# Patient Record
Sex: FEMALE | Race: WHITE | Employment: OTHER | URBAN - METROPOLITAN AREA
[De-identification: names, ages, dates, MRNs, and addresses within clinical notes are randomized per-mention and may not be internally consistent; named-entity substitution may affect disease eponyms.]

---

## 2018-10-29 PROCEDURE — 83883 ASSAY NEPHELOMETRY NOT SPEC: CPT | Performed by: INTERNAL MEDICINE

## 2018-11-19 PROBLEM — G56.03 BILATERAL CARPAL TUNNEL SYNDROME: Status: ACTIVE | Noted: 2018-11-19

## 2018-12-31 PROBLEM — M18.11 PRIMARY OSTEOARTHRITIS OF FIRST CARPOMETACARPAL JOINT OF RIGHT HAND: Status: ACTIVE | Noted: 2018-12-31

## 2018-12-31 PROBLEM — G56.01 RIGHT CARPAL TUNNEL SYNDROME: Status: ACTIVE | Noted: 2018-12-31

## 2018-12-31 PROBLEM — Z98.890 S/P CARPAL TUNNEL RELEASE: Status: ACTIVE | Noted: 2018-12-31

## 2019-04-08 PROBLEM — I27.20 PULMONARY HYPERTENSION (HCC): Status: ACTIVE | Noted: 2019-04-08

## 2019-04-16 PROBLEM — R00.2 PALPITATIONS: Status: ACTIVE | Noted: 2019-04-16

## 2019-04-16 PROBLEM — I27.20 PULMONARY HYPERTENSION (HCC): Status: RESOLVED | Noted: 2019-04-08 | Resolved: 2019-04-16

## 2019-05-07 PROBLEM — M81.8 OTHER OSTEOPOROSIS WITHOUT CURRENT PATHOLOGICAL FRACTURE: Status: ACTIVE | Noted: 2019-05-07

## 2019-05-09 PROBLEM — I34.1 MVP (MITRAL VALVE PROLAPSE): Status: ACTIVE | Noted: 2019-05-09

## 2020-05-12 PROBLEM — Z00.00 ROUTINE GENERAL MEDICAL EXAMINATION AT A HEALTH CARE FACILITY: Status: ACTIVE | Noted: 2020-05-12

## 2020-05-12 PROBLEM — M81.0 OSTEOPOROSIS: Status: ACTIVE | Noted: 2019-05-07

## 2021-04-29 PROBLEM — M81.8 OTHER OSTEOPOROSIS WITHOUT CURRENT PATHOLOGICAL FRACTURE: Status: ACTIVE | Noted: 2021-04-29

## 2021-05-24 ENCOUNTER — HOSPITAL ENCOUNTER (EMERGENCY)
Facility: HOSPITAL | Age: 71
Discharge: HOME | End: 2021-05-24
Attending: EMERGENCY MEDICINE
Payer: MEDICARE

## 2021-05-24 VITALS
WEIGHT: 115 LBS | HEIGHT: 65 IN | SYSTOLIC BLOOD PRESSURE: 143 MMHG | TEMPERATURE: 99 F | DIASTOLIC BLOOD PRESSURE: 87 MMHG | OXYGEN SATURATION: 97 % | BODY MASS INDEX: 19.16 KG/M2 | RESPIRATION RATE: 16 BRPM | HEART RATE: 60 BPM

## 2021-05-24 DIAGNOSIS — J93.9 PNEUMOTHORAX, UNSPECIFIED TYPE: ICD-10-CM

## 2021-05-24 DIAGNOSIS — S22.41XA CLOSED FRACTURE OF MULTIPLE RIBS OF RIGHT SIDE, INITIAL ENCOUNTER: Primary | ICD-10-CM

## 2021-05-24 PROBLEM — R07.81 RIB PAIN: Status: ACTIVE | Noted: 2021-05-24

## 2021-05-24 PROCEDURE — 99283 EMERGENCY DEPT VISIT LOW MDM: CPT

## 2021-05-24 RX ORDER — ACETAMINOPHEN AND CODEINE PHOSPHATE 300; 30 MG/1; MG/1
1 TABLET ORAL ONCE
Status: COMPLETED | OUTPATIENT
Start: 2021-05-24 | End: 2021-05-24

## 2021-05-24 RX ORDER — ACETAMINOPHEN AND CODEINE PHOSPHATE 300; 30 MG/1; MG/1
1 TABLET ORAL EVERY 6 HOURS PRN
Qty: 12 TABLET | Refills: 0 | Status: SHIPPED | OUTPATIENT
Start: 2021-05-24 | End: 2021-05-31

## 2021-05-24 NOTE — ED QUICK NOTES
PT safe to DC home per MD. Lindsey Ayala to dress self. DC teaching done, pt verbalizes understanding. Ambulatory with steady gait to exit.

## 2021-05-24 NOTE — ED INITIAL ASSESSMENT (HPI)
Patient received call by Dr. Aye Nina for abnormal xrays. Patient with multiple right sided rib fractures and small pneumothorax. Patient states she was tackled down by a few dogs [place] the dog park on Saturday. Patient appears in no acute distress.  Maria Del Carmen

## 2021-05-25 NOTE — ED PROVIDER NOTES
Patient Seen in: [facility] Emergency Department    History   Patient presents with:  Fall    Stated Complaint: sent by pmd for fall yesterday    HPI    Patient complains of mechanical fall that occurred  2 days ago knocked over by her dog. Neda Cheatham Acetaminophen-Codeine #3 300-30 MG Oral Tab,  Take 1 tablet by mouth every 6 (six) hours as needed for Pain. alendronate 70 MG Oral Tab,  Take 1 tablet (70 mg total) by mouth once a week.  Take on empty stomach with water in AM. Do not eat, drink, or lay PERRLA, EOMI, conj sclera clear  THROAT: mmm, no lesions  NECK: supple, no midline tenderness, no pain with axial load, ROM intact   LUNGS: no resp distress, cta bilateral tender along r medial lat ribs  CARDIO: RRR without murmur  GI: abdomen is soft and occurred 48 hrs prior stable small pthx. dw with pulm will dc IS fu in office. Will return for resp distress.                              Disposition and Plan     Clinical Impression:  Closed fracture of multiple ribs of right side, initial encounter  (p

## 2021-06-07 PROBLEM — S22.41XD CLOSED FRACTURE OF MULTIPLE RIBS OF RIGHT SIDE WITH ROUTINE HEALING: Status: ACTIVE | Noted: 2021-06-07

## 2021-06-07 PROBLEM — S27.0XXA TRAUMATIC PNEUMOTHORAX: Status: ACTIVE | Noted: 2021-06-07
